# Patient Record
(demographics unavailable — no encounter records)

---

## 2025-05-27 NOTE — PHYSICAL EXAM
[Obese] : obese [Acanthosis Nigricans___] : no acanthosis nigricans [Well formed] : well formed [Normally Set] : normally set [WNL for age] : within normal limits of age [Goiter] : no goiter [None] : there were no thyroid nodules [Normal S1 and S2] : normal S1 and S2 [Murmur] : no murmurs [Clear to Ausculation Bilaterally] : clear to auscultation bilaterally [Abdomen Soft] : soft [Abdomen Tenderness] : non-tender [] : no hepatosplenomegaly [1] : was Jose stage 1 [Normal Appearance] : normal in appearance [Jose Stage ___] : the Jose stage for breast development was [unfilled] [Normal] : normal  [FreeTextEntry2] : None

## 2025-05-27 NOTE — PAST MEDICAL HISTORY
[At ___ Weeks Gestation] : at [unfilled] weeks gestation [Normal Vaginal Route] : by normal vaginal route [Speech & Motor Delay] : patient has speech and motor delay  [Occupational Therapy] : occupational therapy [Speech Therapy] : speech therapy [Age Appropriate] : age appropriate developmental milestones not met [FreeTextEntry1] : ~6 lbs [FreeTextEntry4] : NICU x2 days due to hypoglycemia [FreeTextEntry3] : walked at 2 yo; spoke at 4yo

## 2025-05-27 NOTE — FAMILY HISTORY
[___ inches] : [unfilled] inches [FreeTextEntry5] : 12yo  [FreeTextEntry2] : 21 yo half -brother 17 yo half - brother 3 yo half - sister and 3 yo half - sister

## 2025-05-27 NOTE — REASON FOR VISIT
[Consultation] : a consultation visit [Patient] : patient [Mother] : mother [FreeTextEntry1] : obesity/elevated cholesterol

## 2025-05-27 NOTE — ASSESSMENT
[FreeTextEntry1] : 9 year 5-month-old female with obesity and hypercholesterolemia. Discussed importance of healthy diet changes and exercise to control hypercholesterolemia and prevent other complications of obesity, like type 2 diabetes, hypertension etc.   Repeat fasting labs obtained in the office today.  Will f/u results and contact mother to discuss.  Recommended to schedule appointment with Nutritionist.

## 2025-05-27 NOTE — CONSULT LETTER
[Dear  ___] : Dear ~CLAUDIO, [Consult Letter:] : I had the pleasure of evaluating your patient, [unfilled]. [Please see my note below.] : Please see my note below. [Consult Closing:] : Thank you very much for allowing me to participate in the care of this patient.  If you have any questions, please do not hesitate to contact me. [Sincerely,] : Sincerely, [FreeTextEntry3] : Fatoumata Marti MD Pediatric Endocrinologist [facility]

## 2025-05-27 NOTE — REVIEW OF SYSTEMS
[Change in Activity] : no change in activity [Rash] : no rash [Skin Lesions] : no skin lesions [Back Pain] : ~T no back pain [Chest Pain] : no chest pain or discomfort [Cough] : no cough [Shortness of Breath] : no shortness of breath [Change in Appetite] : no change in appetite [Abdominal Pain] : no abdominal pain [Constipation] : constipation [Sleep Disturbances] : ~T sleep disturbances [Headache] : no headache [Heat Intolerance] : heat intolerant

## 2025-05-27 NOTE — HISTORY OF PRESENT ILLNESS
[FreeTextEntry2] : Hermelinda is a 9 year 5-month-old female with his ASD and asthma referred by PCP Shama Gaffney NP at [facility] for evaluation of obesity and hypercholesterolemia discovered on her annual fasting labs.   Mother reports that Hermelinda is a very picky eater. She skips breakfast and lunch in school and has large portions and second servings for dinner. She eats meals from [employer] ~ twice per week.  Diet recall:    - breakfast: skips  - snack in school: cucumbers, blueberries, homemade muffin  - lunch: chips, rice and broccoli, pizza, fries  - dinner: pasta, rice  - drinks: water, seltzer, Vitamin water, cesario farideh She goes for seconds    Hermelinda is always tired, per mom. She has a history of constipation, now improved. She has eczema. Denied hair loss and cold intolerance.   Patient has history of asthma. She is on Albuterol PRN. She requires oral steroids 1-2 per year.  She snores at night. Has enlarged tonsils. Was seen by ENT, had sleep studies, which showed moderate sleep apnea.   Pertinent FHx; maternal great aunt diet of heart attack in her early 60's. MGM disease in early 60's unknown cause. MGGF had heart attack at older age.  [Premenarchal] : premenarchal